# Patient Record
Sex: MALE | Race: WHITE | Employment: OTHER | URBAN - METROPOLITAN AREA
[De-identification: names, ages, dates, MRNs, and addresses within clinical notes are randomized per-mention and may not be internally consistent; named-entity substitution may affect disease eponyms.]

---

## 2020-03-11 ENCOUNTER — TELEPHONE (OUTPATIENT)
Dept: NEUROSURGERY | Facility: CLINIC | Age: 40
End: 2020-03-11

## 2020-03-11 NOTE — TELEPHONE ENCOUNTER
[facility] Call Center    Phone Message    May a detailed message be left on voicemail: yes     Reason for Call: Other: Jose F is calling in with questions regarding [employer] and inquiring if Dr. Rodriguez does any work with [employer]. Please call him back at your convenience. Thank you.       Action Taken: Message routed to:  [facility] ([facility]): Neurosurgery    Travel Screening: Not Applicable

## 2020-03-23 NOTE — TELEPHONE ENCOUNTER
Phone call to patient in response to in-basket message requesting call back in regards to Dr Rodriguez's involvement in [employer] Research.  No answer at given number.  Left VM with contact info and instructions to return call at patient's convenience.     Background/Update/Plan:  Dr Rodriguez currently has no ongoing human clinical trials.